# Patient Record
Sex: FEMALE | Race: WHITE | Employment: UNEMPLOYED | URBAN - METROPOLITAN AREA
[De-identification: names, ages, dates, MRNs, and addresses within clinical notes are randomized per-mention and may not be internally consistent; named-entity substitution may affect disease eponyms.]

---

## 2024-03-04 ENCOUNTER — OFFICE VISIT (OUTPATIENT)
Dept: OBGYN CLINIC | Age: 29
End: 2024-03-04
Payer: MEDICAID

## 2024-03-04 VITALS — DIASTOLIC BLOOD PRESSURE: 56 MMHG | WEIGHT: 148.1 LBS | SYSTOLIC BLOOD PRESSURE: 98 MMHG

## 2024-03-04 DIAGNOSIS — Z11.3 SCREENING EXAMINATION FOR STD (SEXUALLY TRANSMITTED DISEASE): ICD-10-CM

## 2024-03-04 DIAGNOSIS — N76.6 VULVAR ULCERATION: ICD-10-CM

## 2024-03-04 DIAGNOSIS — Z13.89 SCREENING FOR GENITOURINARY CONDITION: ICD-10-CM

## 2024-03-04 DIAGNOSIS — N76.6 VULVAR ULCERATION: Primary | ICD-10-CM

## 2024-03-04 LAB
BILIRUBIN, URINE, POC: NEGATIVE
BLOOD URINE, POC: NEGATIVE
GLUCOSE URINE, POC: NEGATIVE
KETONES, URINE, POC: NEGATIVE
LEUKOCYTE ESTERASE, URINE, POC: NEGATIVE
NITRITE, URINE, POC: NEGATIVE
PH, URINE, POC: 6 (ref 4.6–8)
PROTEIN,URINE, POC: NEGATIVE
SPECIFIC GRAVITY, URINE, POC: 1.03 (ref 1–1.03)
URINALYSIS CLARITY, POC: CLEAR
URINALYSIS COLOR, POC: YELLOW
UROBILINOGEN, POC: NORMAL

## 2024-03-04 PROCEDURE — 99204 OFFICE O/P NEW MOD 45 MIN: CPT | Performed by: NURSE PRACTITIONER

## 2024-03-04 PROCEDURE — 81003 URINALYSIS AUTO W/O SCOPE: CPT | Performed by: NURSE PRACTITIONER

## 2024-03-04 RX ORDER — VALACYCLOVIR HYDROCHLORIDE 500 MG/1
500 TABLET, FILM COATED ORAL 2 TIMES DAILY
Qty: 30 TABLET | Refills: 1 | Status: SHIPPED | OUTPATIENT
Start: 2024-03-04

## 2024-03-04 NOTE — PROGRESS NOTES
New  patient is a 28 y.o.  who is seen  today to for GYN exam.     Patient c/o vaginal burning, itching, urinary urgency. She has some sore on both  of her labia and it burns when she urinates. Symptoms started about 5 days ago. Patient states similar symptoms happened about 2 months ago.   Patient messaged her previous midwife and she and she was prescribed a suppository to clear any bacteria that she had at the moment. States healed on its own.   She also took some cranberry supplements and cranberry juice.    Notes lesion burns when urine hits it. +itching. Sore has been present for 5d.   No drainage from lesion.  She is not physically scratching the lesion right now.  No prior hx HSV.   No abnl vag d/c or odor.       Patient also complaining of black moles on her labia and she is concerned as she has a  history of cervical and ovarian cancer in her family. She would like those to be checked. Patient has some difficulties with her low blood pressure during pregnancy and postpartum.      Pap Smear: few yrs ago. Due for one.   Mammogram:NA  Colonoscopy:NA  DEXA:NA    HISTORY:      Patient's last menstrual period was 2023 (exact date).  Sexual History:  has sex with males  Contraception:  none  No current outpatient medications on file prior to visit.     No current facility-administered medications on file prior to visit.       ROS:  Feeling well. No dyspnea or chest pain on exertion.  No abdominal pain, change in bowel habits, black or bloody stools.  No urinary tract symptoms. GYN ROS: she complains of sore on vulva.    PHYSICAL EXAM:  Blood pressure (!) 98/56, weight 67.2 kg (148 lb 1.6 oz), last menstrual period 2023, currently breastfeeding.    The patient appears well, alert, oriented x 3, in no distress.  Physical Exam  Genitourinary:      Genitourinary Comments: Small pin-prick sized purple areas seen on bilateral labia majora c/w varicosities, likely from pregnancy and

## 2024-03-05 LAB
HSV1 IGG SER IA-ACNC: <0.91 INDEX (ref 0–0.9)
HSV2 IGG SER IA-ACNC: <0.91 INDEX (ref 0–0.9)

## 2024-03-06 ENCOUNTER — TELEPHONE (OUTPATIENT)
Dept: OBGYN CLINIC | Age: 29
End: 2024-03-06

## 2024-03-06 LAB
HSV1 DNA SPEC QL NAA+PROBE: NEGATIVE
HSV2 DNA SPEC QL NAA+PROBE: NEGATIVE
SPECIMEN SOURCE: NORMAL

## 2024-03-06 NOTE — TELEPHONE ENCOUNTER
Pt LVM requesting review of labs, swab results pending. Message to NP for review. Pt notified she would be contacted once labs are reviewed.

## 2024-03-07 ENCOUNTER — TELEPHONE (OUTPATIENT)
Dept: OBGYN CLINIC | Age: 29
End: 2024-03-07

## 2024-03-07 LAB
C TRACH RRNA SPEC QL NAA+PROBE: NEGATIVE
N GONORRHOEA RRNA SPEC QL NAA+PROBE: NEGATIVE
SPECIMEN SOURCE: NORMAL
T VAGINALIS RRNA SPEC QL NAA+PROBE: NEGATIVE

## 2024-03-07 NOTE — TELEPHONE ENCOUNTER
Spoke with patient.  Pt notified of negative HSV testing by swab and blood work.  Pt states area is still present but slightly improved. She does feel sore like areas have spread to her buttocks. Pt has been taking Valtrex 500mg BID. Admits to burning/irritation to areas with urination.  Spoke with OMAR Carr.  She is requesting patient come back in tomorrow for blood work and re-evaluation.  Spoke with patient and notified her of this.  Appt scheduled.  All questions answered. She v/u.

## 2024-03-07 NOTE — TELEPHONE ENCOUNTER
----- Message from STEPHEN Suresh - CNP sent at 3/7/2024  8:12 AM EST -----  Pls notify pt all screening was neg. HSV swab and blood testing neg.   Is sore still present after taking valtrex?  Rec adding RPR to bloodwork out of an abundance of precaution.  Pls let me know what her current sx are like and I will provide advice.

## 2024-03-08 ENCOUNTER — OFFICE VISIT (OUTPATIENT)
Dept: OBGYN CLINIC | Age: 29
End: 2024-03-08
Payer: MEDICAID

## 2024-03-08 VITALS
DIASTOLIC BLOOD PRESSURE: 62 MMHG | BODY MASS INDEX: 24.78 KG/M2 | HEIGHT: 65 IN | WEIGHT: 148.7 LBS | SYSTOLIC BLOOD PRESSURE: 100 MMHG

## 2024-03-08 DIAGNOSIS — L29.2 VULVAR ITCHING: ICD-10-CM

## 2024-03-08 DIAGNOSIS — Z11.3 SCREENING EXAMINATION FOR STD (SEXUALLY TRANSMITTED DISEASE): ICD-10-CM

## 2024-03-08 DIAGNOSIS — N76.6 VULVAR ULCERATION: Primary | ICD-10-CM

## 2024-03-08 LAB
HIV 1+2 AB+HIV1 P24 AG SERPL QL IA: NONREACTIVE
HIV 1/2 RESULT COMMENT: NORMAL

## 2024-03-08 PROCEDURE — 99214 OFFICE O/P EST MOD 30 MIN: CPT | Performed by: NURSE PRACTITIONER

## 2024-03-08 RX ORDER — CLOBETASOL PROPIONATE 0.5 MG/G
OINTMENT TOPICAL
Qty: 45 G | Refills: 0 | Status: SHIPPED | OUTPATIENT
Start: 2024-03-08

## 2024-03-08 NOTE — PROGRESS NOTES
The patient is a 28 y.o.  who is seen for  bilateral vulvar ulcerations follow-up and re-evaluation.    I saw pt earlier this week on 3/4/24, she has long hx chronic vulvar itching. At that time she had a distinct ulceration at inner aspect of R labia. She notes she had a similar ulceration a couple months ago.  This ulceration was very suspicious for HSV so she was given valtrex in the interim, which she is still taking.  HSV swab was taken and was neg, serology also was neg.     Pt notes since starting valtrex, it has helped a little, pain is not as sharp. States pain is more manageable since starting valtrex.  No problems passing urine.     Patient c/o burning and irritation to areas with urination and friction. Patient thinks she now has some ulcers develop around her anal area since her last visit.     She has a a long hx of chronic vulvar itching and has been unable to find any underlying causative factor throughout the years.       HISTORY:      Patient's last menstrual period was 2023 (exact date).  Sexual History:  has sex with males  Contraception:  none  Current Outpatient Medications on File Prior to Visit   Medication Sig Dispense Refill    valACYclovir (VALTREX) 500 MG tablet Take 1 tablet by mouth 2 times daily X5-7d as needed for outbreak 30 tablet 1     No current facility-administered medications on file prior to visit.       ROS:  Feeling well. No dyspnea or chest pain on exertion.  No abdominal pain, change in bowel habits, black or bloody stools.  No urinary tract symptoms. GYN ROS: she complains of vulvar ulceration, chronic vulvar itching..    PHYSICAL EXAM:  Blood pressure 100/62, height 1.651 m (5' 5\"), weight 67.4 kg (148 lb 11.2 oz), last menstrual period 2023, currently breastfeeding.    The patient appears well, alert, oriented x 3, in no distress.   Pelvic: VULVA: previously seen vulvar ulceration appears well healed. There is an excoriation seen at clitoral

## 2024-03-11 LAB — RPR SER QL: NONREACTIVE
